# Patient Record
Sex: MALE | Race: OTHER | HISPANIC OR LATINO | Employment: FULL TIME | URBAN - METROPOLITAN AREA
[De-identification: names, ages, dates, MRNs, and addresses within clinical notes are randomized per-mention and may not be internally consistent; named-entity substitution may affect disease eponyms.]

---

## 2021-02-14 ENCOUNTER — HOSPITAL ENCOUNTER (EMERGENCY)
Facility: HOSPITAL | Age: 41
Discharge: HOME | End: 2021-02-14
Attending: EMERGENCY MEDICINE | Admitting: EMERGENCY MEDICINE

## 2021-02-14 VITALS
DIASTOLIC BLOOD PRESSURE: 105 MMHG | SYSTOLIC BLOOD PRESSURE: 177 MMHG | RESPIRATION RATE: 18 BRPM | OXYGEN SATURATION: 95 % | TEMPERATURE: 97.7 F | HEART RATE: 72 BPM

## 2021-02-14 DIAGNOSIS — S69.91XA INJURY OF FINGER OF RIGHT HAND, INITIAL ENCOUNTER: ICD-10-CM

## 2021-02-14 DIAGNOSIS — S61.209A AVULSION OF FINGER, INITIAL ENCOUNTER: Primary | ICD-10-CM

## 2021-02-14 PROCEDURE — 99282 EMERGENCY DEPT VISIT SF MDM: CPT | Performed by: EMERGENCY MEDICINE

## 2021-02-14 PROCEDURE — 99283 EMERGENCY DEPT VISIT LOW MDM: CPT

## 2021-02-14 PROCEDURE — 90715 TDAP VACCINE 7 YRS/> IM: CPT | Performed by: EMERGENCY MEDICINE

## 2021-02-14 PROCEDURE — 90471 IMMUNIZATION ADMIN: CPT

## 2021-02-14 RX ADMIN — TETANUS TOXOID, REDUCED DIPHTHERIA TOXOID AND ACELLULAR PERTUSSIS VACCINE, ADSORBED 0.5 ML: 5; 2.5; 8; 8; 2.5 SUSPENSION INTRAMUSCULAR at 20:04

## 2021-02-15 ENCOUNTER — APPOINTMENT (OUTPATIENT)
Dept: URGENT CARE | Age: 41
End: 2021-02-15
Payer: OTHER MISCELLANEOUS

## 2021-02-15 PROCEDURE — 99213 OFFICE O/P EST LOW 20 MIN: CPT | Performed by: NURSE PRACTITIONER

## 2021-02-15 NOTE — ED PROVIDER NOTES
History  Chief Complaint   Patient presents with    Finger Injury     R hand 5th digit - cut off small part of distal skin with scissors  Unsure of tetanus shot last dose  Bleeding and pain controlled  22-year-old male no reported past history presenting with finger avulsion  Patient reports that he was working on his car using a pair of snips and did not realize that his finger was in the way and he cut off the pad of his right small finger  He reports he had some bleeding initially  He reports his friend put quick clot on the wound with hemostasis  Denies any current pain or bleeding  He denies any other injury  Unknown last tetanus  He denies any headache, vision changes, chest pain, shortness of breath, abdominal pain, nausea vomiting, fever/chills, or any bladder or bowel changes  None       History reviewed  No pertinent past medical history  History reviewed  No pertinent surgical history  History reviewed  No pertinent family history  I have reviewed and agree with the history as documented  E-Cigarette/Vaping     E-Cigarette/Vaping Substances     Social History     Tobacco Use    Smoking status: Never Smoker    Smokeless tobacco: Never Used   Substance Use Topics    Alcohol use: Yes     Frequency: Monthly or less    Drug use: Never        Review of Systems   Constitutional: Negative for appetite change, chills, diaphoresis and fever  HENT: Negative for congestion, rhinorrhea and sore throat  Eyes: Negative for photophobia and visual disturbance  Respiratory: Negative for chest tightness and shortness of breath  Cardiovascular: Negative for chest pain and leg swelling  Gastrointestinal: Negative for abdominal distention, abdominal pain, blood in stool, constipation, diarrhea, nausea and vomiting  Genitourinary: Negative for difficulty urinating and dysuria  Musculoskeletal: Negative for back pain and joint swelling  Skin: Positive for wound   Negative for rash    Neurological: Negative for dizziness, weakness, light-headedness, numbness and headaches  Psychiatric/Behavioral: Negative for agitation and confusion  All other systems reviewed and are negative  Physical Exam  ED Triage Vitals [02/14/21 1939]   Temperature Pulse Respirations Blood Pressure SpO2   97 7 °F (36 5 °C) 72 18 (!) 177/105 95 %      Temp Source Heart Rate Source Patient Position - Orthostatic VS BP Location FiO2 (%)   Oral Monitor Sitting Left arm --      Pain Score       3             Orthostatic Vital Signs  Vitals:    02/14/21 1939   BP: (!) 177/105   Pulse: 72   Patient Position - Orthostatic VS: Sitting       Physical Exam  Vitals signs and nursing note reviewed  Constitutional:       General: He is not in acute distress  Appearance: Normal appearance  He is well-developed  He is not ill-appearing, toxic-appearing or diaphoretic  HENT:      Head: Normocephalic and atraumatic  Nose: Nose normal  No congestion or rhinorrhea  Mouth/Throat:      Mouth: Mucous membranes are moist       Pharynx: Oropharynx is clear  No oropharyngeal exudate or posterior oropharyngeal erythema  Eyes:      General: No scleral icterus  Right eye: No discharge  Left eye: No discharge  Extraocular Movements: Extraocular movements intact  Conjunctiva/sclera: Conjunctivae normal       Pupils: Pupils are equal, round, and reactive to light  Neck:      Musculoskeletal: Normal range of motion and neck supple  No neck rigidity or muscular tenderness  Vascular: No JVD  Trachea: No tracheal deviation  Cardiovascular:      Rate and Rhythm: Normal rate and regular rhythm  Heart sounds: Normal heart sounds  No murmur  No friction rub  No gallop  Pulmonary:      Effort: Pulmonary effort is normal  No respiratory distress  Breath sounds: Normal breath sounds  No stridor  No wheezing or rales  Chest:      Chest wall: No tenderness     Abdominal: General: Bowel sounds are normal  There is no distension  Palpations: Abdomen is soft  Tenderness: There is no abdominal tenderness  There is no right CVA tenderness, left CVA tenderness, guarding or rebound  Musculoskeletal: Normal range of motion  General: No swelling, tenderness, deformity or signs of injury  Right lower leg: No edema  Left lower leg: No edema  Lymphadenopathy:      Cervical: No cervical adenopathy  Skin:     General: Skin is warm and dry  Coloration: Skin is not pale  Findings: Lesion present  No erythema or rash  Comments: Patient with avulsion of the pad of the distal right 5th finger  No bone exposure  Into subcutaneous tissue  Bleeding controlled  No nail involvement  Neurological:      General: No focal deficit present  Mental Status: He is alert and oriented to person, place, and time  Mental status is at baseline  Cranial Nerves: No cranial nerve deficit  Sensory: No sensory deficit  Motor: No weakness or abnormal muscle tone  Coordination: Coordination normal       Gait: Gait normal       Comments: A&Ox3 to person, place, and time  CN 2-12 intact  Strength 5/5 throughout  Sensation grossly intact  Cerebellar exam including gait intact  Psychiatric:         Behavior: Behavior normal          Thought Content: Thought content normal          ED Medications  Medications   tetanus-diphtheria-acellular pertussis (BOOSTRIX) IM injection 0 5 mL (0 5 mL Intramuscular Given 2/14/21 2004)       Diagnostic Studies  Results Reviewed     None                 No orders to display         Procedures  Procedures      ED Course                                       MDM  Number of Diagnoses or Management Options  Avulsion of finger, initial encounter:   Injury of finger of right hand, initial encounter:   Diagnosis management comments: 40-year-old male no reported past history presenting with finger avulsion    Assessed without any bony or nail involvement  Hemostatic  Plan to update tetanus and dressed the wound after cleaning and irrigating  Tetanus shot  Wound cleaned, irrigated, and dressed  Given extra supplies and advised daily dressing changes  Advised follow-up primary care provider  Given instructions and return precautions  All questions answered  Patient acknowledged understanding of all written and verbal instructions and return precautions  Discharge  Amount and/or Complexity of Data Reviewed  Clinical lab tests: reviewed  Tests in the radiology section of CPT®: reviewed  Tests in the medicine section of CPT®: reviewed  Review and summarize past medical records: yes  Independent visualization of images, tracings, or specimens: yes    Risk of Complications, Morbidity, and/or Mortality  Presenting problems: low  Diagnostic procedures: low  Management options: low    Patient Progress  Patient progress: stable      Disposition  Final diagnoses:   Avulsion of finger, initial encounter - Pad of fifth finger right hand   Injury of finger of right hand, initial encounter     Time reflects when diagnosis was documented in both MDM as applicable and the Disposition within this note     Time User Action Codes Description Comment    2/14/2021  8:28 PM Yenifer Baptise Add [S61 209A] Avulsion of finger, initial encounter     2/14/2021  8:29 PM Yenifer Baptise Add [S69 91XA] Injury of finger of right hand, initial encounter     2/14/2021  8:29 PM Yenifer Baptise Modify [S61 209A] Avulsion of finger, initial encounter Pad of fifth finger right hand      ED Disposition     ED Disposition Condition Date/Time Comment    Discharge Stable Sun Feb 14, 2021  8:30 PM Celena Shahid discharge to home/self care              Follow-up Information     Follow up With Specialties Details Why Contact Info Additional Information    [employer]  Call  to establish care with a primary care provider [phone]       [address] [place] [address] Emergency Department Emergency Medicine Go to  If symptoms worsen [address] Emergency Department, [address] , [place], [address] [facility], [address]   [phone]          There are no discharge medications for this patient  No discharge procedures on file  PDMP Review     None           ED Provider  Attending physically available and evaluated Jared Vijay MOSHER managed the patient along with the ED Attending      Electronically Signed by         Rosalina Farfan MD  02/15/21 7733

## 2021-02-15 NOTE — DISCHARGE INSTRUCTIONS
Please follow-up with primary care provider  Please keep the dressing in place for the next day  Then clean and wash the area with soap and water and gently dry before dressing the wound with gauze again  Please return for severe pain, infectious changes, or any other concerning signs or symptoms  Please refer to the following documents for additional instructions and return precautions

## 2021-02-15 NOTE — ED ATTENDING ATTESTATION
2/14/2021  Jaz Hayes DO, saw and evaluated the patient  I have discussed the patient with the resident/non-physician practitioner and agree with the resident's/non-physician practitioner's findings, Plan of Care, and MDM as documented in the resident's/non-physician practitioner's note, except where noted  All available labs and Radiology studies were reviewed  I was present for key portions of any procedure(s) performed by the resident/non-physician practitioner and I was immediately available to provide assistance  At this point I agree with the current assessment done in the Emergency Department  I have conducted an independent evaluation of this patient a history and physical is as follows:    38 yo RHD male presents for evaluation of avulsion of R little fingertip avulsion  No bony involvement  decontaminated at home, and abx oint placed  Bleeding controlled now  Last tetanus unknown  Will irrigate wound, place non stick dressing, gauze, tegaderm, and gauze wrap  Update tetanus        ED Course         Critical Care Time  Procedures

## 2021-02-17 ENCOUNTER — OCCMED (OUTPATIENT)
Dept: URGENT CARE | Age: 41
End: 2021-02-17
Payer: OTHER MISCELLANEOUS

## 2021-02-17 DIAGNOSIS — S61.216D LACERATION OF RIGHT LITTLE FINGER WITHOUT FOREIGN BODY WITHOUT DAMAGE TO NAIL, SUBSEQUENT ENCOUNTER: Primary | ICD-10-CM

## 2021-02-17 PROCEDURE — 99213 OFFICE O/P EST LOW 20 MIN: CPT | Performed by: PHYSICIAN ASSISTANT
